# Patient Record
Sex: MALE | Race: WHITE
[De-identification: names, ages, dates, MRNs, and addresses within clinical notes are randomized per-mention and may not be internally consistent; named-entity substitution may affect disease eponyms.]

---

## 2022-08-06 ENCOUNTER — HOSPITAL ENCOUNTER (EMERGENCY)
Dept: HOSPITAL 26 - MED | Age: 22
Discharge: HOME | End: 2022-08-06
Payer: COMMERCIAL

## 2022-08-06 VITALS — WEIGHT: 260 LBS | HEIGHT: 74 IN | BODY MASS INDEX: 33.37 KG/M2

## 2022-08-06 VITALS — DIASTOLIC BLOOD PRESSURE: 71 MMHG | SYSTOLIC BLOOD PRESSURE: 126 MMHG
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
## 2022-08-06 DIAGNOSIS — S63.502A: Primary | ICD-10-CM
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
## 2022-08-06 PROCEDURE — 29125 APPL SHORT ARM SPLINT STATIC: CPT

## 2022-08-06 PROCEDURE — 96372 THER/PROPH/DIAG INJ SC/IM: CPT

## 2022-08-06 PROCEDURE — 99283 EMERGENCY DEPT VISIT LOW MDM: CPT

## 2022-08-06 PROCEDURE — 73110 X-RAY EXAM OF WRIST: CPT

## 2022-08-06 NOTE — NUR
Patient discharged with v/s stable. Written and verbal after care instructions 
given and explained. 

Patient alert, oriented and verbalized understanding of instructions. 
Ambulatory with mother to car. All questions addressed prior to discharge. ID 
band removed. Patient advised to follow up with PMD. Rx of iburprofen (sent) 
given. Patient educated on indication of medication including possible reaction 
and side effects. Opportunity to ask questions provided and answered.

## 2022-08-06 NOTE — NUR
22 Y/O MALE BIB SELF C/O OF LEFT WRIST PAIN X1 WEEK, PER PT HE WAS LIFTING 
SOMETHING AT WORK AND A WEIGHT SLIPPED AND "PULLED A MUSCLE". NOTED INCREASED 
PAIN GOING TO THE FOREARM. WAS SEEN BY HIS PRIMARY BUT NO RESULTS WERE GIVEN. 
CRT LESS THAN 3, BILATERAL EQUAL HAND GRASPS, PULSES ARE PALPABLE



NKA

PMH: DENIES

## 2022-09-26 ENCOUNTER — HOSPITAL ENCOUNTER (EMERGENCY)
Dept: HOSPITAL 26 - MED | Age: 22
Discharge: HOME | End: 2022-09-26
Payer: COMMERCIAL

## 2022-09-26 VITALS — BODY MASS INDEX: 28.49 KG/M2 | HEIGHT: 73 IN | WEIGHT: 215 LBS

## 2022-09-26 VITALS — DIASTOLIC BLOOD PRESSURE: 74 MMHG | SYSTOLIC BLOOD PRESSURE: 147 MMHG

## 2022-09-26 DIAGNOSIS — R21: ICD-10-CM

## 2022-09-26 DIAGNOSIS — H57.11: Primary | ICD-10-CM

## 2022-09-26 NOTE — NUR
Patient discharged with v/s stable. Written and verbal after care instructions 
given and explained. 

Patient alert, oriented and verbalized understanding of instructions. 
Ambulatory with steady gait. All questions addressed prior to discharge. ID 
band removed. Patient advised to follow up with PMD. Rx of zovirax, prednisone 
given. Patient educated on indication of medication including possible reaction 
and side effects. Opportunity to ask questions provided and answered.

## 2022-09-26 NOTE — NUR
21 y/o male, pt presents t ed with c/o right eye pain, rash to forehead for 4 
days. pt states rash initially started on his forehead and then following day 
spread to his right eye. pt also reports associated right eye redness, tearing, 
discharge. pt was seen by pcp and given prescription for ibuprofen and bactrim 
and states rash has been getting worse. a&ox4, ambulates with steady gait.



pmh: denies

nka